# Patient Record
Sex: MALE | URBAN - METROPOLITAN AREA
[De-identification: names, ages, dates, MRNs, and addresses within clinical notes are randomized per-mention and may not be internally consistent; named-entity substitution may affect disease eponyms.]

---

## 2021-10-19 ENCOUNTER — APPOINTMENT (RX ONLY)
Dept: URBAN - METROPOLITAN AREA CLINIC 42 | Facility: CLINIC | Age: 36
Setting detail: DERMATOLOGY
End: 2021-10-19

## 2021-10-19 DIAGNOSIS — L21.8 OTHER SEBORRHEIC DERMATITIS: ICD-10-CM | Status: WORSENING

## 2021-10-19 PROCEDURE — ? COUNSELING

## 2021-10-19 PROCEDURE — ? PRESCRIPTION

## 2021-10-19 PROCEDURE — ? RECOMMENDATIONS

## 2021-10-19 PROCEDURE — 99203 OFFICE O/P NEW LOW 30 MIN: CPT

## 2021-10-19 RX ORDER — KETOCONAZOLE 20 MG/G
1GM CREAM TOPICAL QD
Qty: 30 | Refills: 0 | Status: ERX | COMMUNITY
Start: 2021-10-19

## 2021-10-19 RX ORDER — MOMETASONE FUROATE 1 MG/G
1GM CREAM TOPICAL QD
Qty: 45 | Refills: 0 | Status: ERX | COMMUNITY
Start: 2021-10-19

## 2021-10-19 RX ADMIN — MOMETASONE FUROATE 1GM: 1 CREAM TOPICAL at 00:00

## 2021-10-19 RX ADMIN — KETOCONAZOLE 1GM: 20 CREAM TOPICAL at 00:00

## 2021-10-19 ASSESSMENT — LOCATION SIMPLE DESCRIPTION DERM
LOCATION SIMPLE: LEFT EYEBROW
LOCATION SIMPLE: LEFT CHEEK
LOCATION SIMPLE: RIGHT EYEBROW
LOCATION SIMPLE: RIGHT CHEEK

## 2021-10-19 ASSESSMENT — LOCATION DETAILED DESCRIPTION DERM
LOCATION DETAILED: LEFT MEDIAL MALAR CHEEK
LOCATION DETAILED: RIGHT CENTRAL EYEBROW
LOCATION DETAILED: LEFT CENTRAL EYEBROW
LOCATION DETAILED: RIGHT MEDIAL MALAR CHEEK

## 2021-10-19 ASSESSMENT — LOCATION ZONE DERM: LOCATION ZONE: FACE

## 2021-10-19 NOTE — PROCEDURE: RECOMMENDATIONS
Detail Level: Zone
Render Risk Assessment In Note?: no
Recommendation Preamble: The following recommendations were made during the visit:
Recommendations (Free Text): Recommended to continue Cerave moisturizing cream and start Cerave gentle face cleanser \\nSamples of Cerave sheer mineral face spf 50+ also talked about skin ceuticals spf 50 that we sell in the office